# Patient Record
Sex: FEMALE | Race: WHITE | NOT HISPANIC OR LATINO | Employment: OTHER | URBAN - METROPOLITAN AREA
[De-identification: names, ages, dates, MRNs, and addresses within clinical notes are randomized per-mention and may not be internally consistent; named-entity substitution may affect disease eponyms.]

---

## 2019-04-08 PROBLEM — Z79.4 TYPE 2 DIABETES MELLITUS WITH DIABETIC POLYNEUROPATHY, WITH LONG-TERM CURRENT USE OF INSULIN: Status: ACTIVE | Noted: 2019-04-08

## 2019-04-08 PROBLEM — N18.30 CKD STAGE 3 DUE TO TYPE 2 DIABETES MELLITUS: Status: ACTIVE | Noted: 2019-04-08

## 2019-04-08 PROBLEM — E11.42 TYPE 2 DIABETES MELLITUS WITH DIABETIC POLYNEUROPATHY, WITH LONG-TERM CURRENT USE OF INSULIN: Status: ACTIVE | Noted: 2019-04-08

## 2019-04-08 PROBLEM — E11.22 CKD STAGE 3 DUE TO TYPE 2 DIABETES MELLITUS: Status: ACTIVE | Noted: 2019-04-08

## 2019-04-08 PROBLEM — I10 ESSENTIAL HYPERTENSION: Status: ACTIVE | Noted: 2019-04-08

## 2021-01-10 ENCOUNTER — IMMUNIZATION (OUTPATIENT)
Dept: FAMILY MEDICINE | Facility: CLINIC | Age: 81
End: 2021-01-10
Payer: MEDICARE

## 2021-01-10 DIAGNOSIS — Z23 NEED FOR VACCINATION: ICD-10-CM

## 2021-01-10 PROCEDURE — 0001A COVID-19, MRNA, LNP-S, PF, 30 MCG/0.3 ML DOSE VACCINE: CPT | Mod: CV19,,, | Performed by: FAMILY MEDICINE

## 2021-01-10 PROCEDURE — 91300 COVID-19, MRNA, LNP-S, PF, 30 MCG/0.3 ML DOSE VACCINE: CPT | Mod: ,,, | Performed by: FAMILY MEDICINE

## 2021-01-10 PROCEDURE — 91300 COVID-19, MRNA, LNP-S, PF, 30 MCG/0.3 ML DOSE VACCINE: ICD-10-PCS | Mod: ,,, | Performed by: FAMILY MEDICINE

## 2021-01-10 PROCEDURE — 0001A COVID-19, MRNA, LNP-S, PF, 30 MCG/0.3 ML DOSE VACCINE: ICD-10-PCS | Mod: CV19,,, | Performed by: FAMILY MEDICINE

## 2021-01-31 ENCOUNTER — IMMUNIZATION (OUTPATIENT)
Dept: FAMILY MEDICINE | Facility: CLINIC | Age: 81
End: 2021-01-31
Payer: MEDICARE

## 2021-01-31 DIAGNOSIS — Z23 NEED FOR VACCINATION: Primary | ICD-10-CM

## 2021-01-31 PROCEDURE — 91300 COVID-19, MRNA, LNP-S, PF, 30 MCG/0.3 ML DOSE VACCINE: CPT | Mod: ,,, | Performed by: INTERNAL MEDICINE

## 2021-01-31 PROCEDURE — 0002A COVID-19, MRNA, LNP-S, PF, 30 MCG/0.3 ML DOSE VACCINE: ICD-10-PCS | Mod: CV19,,, | Performed by: INTERNAL MEDICINE

## 2021-01-31 PROCEDURE — 0002A COVID-19, MRNA, LNP-S, PF, 30 MCG/0.3 ML DOSE VACCINE: CPT | Mod: CV19,,, | Performed by: INTERNAL MEDICINE

## 2021-01-31 PROCEDURE — 91300 COVID-19, MRNA, LNP-S, PF, 30 MCG/0.3 ML DOSE VACCINE: ICD-10-PCS | Mod: ,,, | Performed by: INTERNAL MEDICINE

## 2021-02-14 PROBLEM — E11.3293 MILD NONPROLIFERATIVE DIABETIC RETINOPATHY OF BOTH EYES WITHOUT MACULAR EDEMA ASSOCIATED WITH TYPE 2 DIABETES MELLITUS: Status: ACTIVE | Noted: 2021-02-14

## 2022-07-11 ENCOUNTER — TELEPHONE (OUTPATIENT)
Dept: NEPHROLOGY | Facility: CLINIC | Age: 82
End: 2022-07-11

## 2022-07-11 ENCOUNTER — TELEPHONE (OUTPATIENT)
Dept: NEPHROLOGY | Facility: CLINIC | Age: 82
End: 2022-07-11
Payer: MEDICARE

## 2022-07-11 ENCOUNTER — OFFICE VISIT (OUTPATIENT)
Dept: NEPHROLOGY | Facility: CLINIC | Age: 82
End: 2022-07-11
Payer: MEDICARE

## 2022-07-11 VITALS
HEART RATE: 79 BPM | DIASTOLIC BLOOD PRESSURE: 36 MMHG | BODY MASS INDEX: 29.21 KG/M2 | HEIGHT: 63 IN | WEIGHT: 164.88 LBS | OXYGEN SATURATION: 98 % | SYSTOLIC BLOOD PRESSURE: 154 MMHG

## 2022-07-11 DIAGNOSIS — E87.5 HYPERKALEMIA: ICD-10-CM

## 2022-07-11 DIAGNOSIS — I10 ESSENTIAL HYPERTENSION: ICD-10-CM

## 2022-07-11 DIAGNOSIS — E11.22 CKD STAGE 3 DUE TO TYPE 2 DIABETES MELLITUS: ICD-10-CM

## 2022-07-11 DIAGNOSIS — N18.30 CKD STAGE 3 DUE TO TYPE 2 DIABETES MELLITUS: ICD-10-CM

## 2022-07-11 DIAGNOSIS — E11.3293 MILD NONPROLIFERATIVE DIABETIC RETINOPATHY OF BOTH EYES WITHOUT MACULAR EDEMA ASSOCIATED WITH TYPE 2 DIABETES MELLITUS: Primary | ICD-10-CM

## 2022-07-11 PROCEDURE — 3078F PR MOST RECENT DIASTOLIC BLOOD PRESSURE < 80 MM HG: ICD-10-PCS | Mod: CPTII,S$GLB,, | Performed by: INTERNAL MEDICINE

## 2022-07-11 PROCEDURE — 99999 PR PBB SHADOW E&M-EST. PATIENT-LVL IV: ICD-10-PCS | Mod: PBBFAC,,, | Performed by: INTERNAL MEDICINE

## 2022-07-11 PROCEDURE — 1159F MED LIST DOCD IN RCRD: CPT | Mod: CPTII,S$GLB,, | Performed by: INTERNAL MEDICINE

## 2022-07-11 PROCEDURE — 3288F FALL RISK ASSESSMENT DOCD: CPT | Mod: CPTII,S$GLB,, | Performed by: INTERNAL MEDICINE

## 2022-07-11 PROCEDURE — 3078F DIAST BP <80 MM HG: CPT | Mod: CPTII,S$GLB,, | Performed by: INTERNAL MEDICINE

## 2022-07-11 PROCEDURE — 1101F PT FALLS ASSESS-DOCD LE1/YR: CPT | Mod: CPTII,S$GLB,, | Performed by: INTERNAL MEDICINE

## 2022-07-11 PROCEDURE — 99204 OFFICE O/P NEW MOD 45 MIN: CPT | Mod: S$GLB,,, | Performed by: INTERNAL MEDICINE

## 2022-07-11 PROCEDURE — 3077F PR MOST RECENT SYSTOLIC BLOOD PRESSURE >= 140 MM HG: ICD-10-PCS | Mod: CPTII,S$GLB,, | Performed by: INTERNAL MEDICINE

## 2022-07-11 PROCEDURE — 1160F RVW MEDS BY RX/DR IN RCRD: CPT | Mod: CPTII,S$GLB,, | Performed by: INTERNAL MEDICINE

## 2022-07-11 PROCEDURE — 99204 PR OFFICE/OUTPT VISIT, NEW, LEVL IV, 45-59 MIN: ICD-10-PCS | Mod: S$GLB,,, | Performed by: INTERNAL MEDICINE

## 2022-07-11 PROCEDURE — 99999 PR PBB SHADOW E&M-EST. PATIENT-LVL IV: CPT | Mod: PBBFAC,,, | Performed by: INTERNAL MEDICINE

## 2022-07-11 PROCEDURE — 1159F PR MEDICATION LIST DOCUMENTED IN MEDICAL RECORD: ICD-10-PCS | Mod: CPTII,S$GLB,, | Performed by: INTERNAL MEDICINE

## 2022-07-11 PROCEDURE — 1160F PR REVIEW ALL MEDS BY PRESCRIBER/CLIN PHARMACIST DOCUMENTED: ICD-10-PCS | Mod: CPTII,S$GLB,, | Performed by: INTERNAL MEDICINE

## 2022-07-11 PROCEDURE — 3288F PR FALLS RISK ASSESSMENT DOCUMENTED: ICD-10-PCS | Mod: CPTII,S$GLB,, | Performed by: INTERNAL MEDICINE

## 2022-07-11 PROCEDURE — 1101F PR PT FALLS ASSESS DOC 0-1 FALLS W/OUT INJ PAST YR: ICD-10-PCS | Mod: CPTII,S$GLB,, | Performed by: INTERNAL MEDICINE

## 2022-07-11 PROCEDURE — 3077F SYST BP >= 140 MM HG: CPT | Mod: CPTII,S$GLB,, | Performed by: INTERNAL MEDICINE

## 2022-07-11 NOTE — TELEPHONE ENCOUNTER
----- Message from Edy Joaquin sent at 7/11/2022  1:23 PM CDT -----  Contact: Rebecca Dominique (Daughter)  Type: Patient Call Back    Who called:Rebecca Blevinssampson (Daughter)    What is the request in detail: The patient daughter would like a call in regards to appointment for today    Can the clinic reply by [facility]?    Would the patient rather a call back or a response via [facility]?     Best call back number:[phone]    Additional Information:

## 2022-07-11 NOTE — TELEPHONE ENCOUNTER
----- Message from Pablito John MA sent at 7/11/2022 12:54 PM CDT -----  Contact: OSMIN MOBLEY [[id number]]  Type: Needs Medical Advice    Who Called:OSMIN MOBLEY [[id number]]  Best Call Back Number: [phone]  Inquiry/Question: Please call OSMIN MOBLEY [[id number]] regarding missed call from clinic    Thank you~

## 2022-07-11 NOTE — PROGRESS NOTES
"Subjective:       Patient ID: Claudia Guerra is a 82 y.o. White female who presents for initial evaluation of serum creatinine referred by PCP, dr Cosby.     Renal function per chart review with sr cr baseline of 1.3 mg/dL in 2021 and now increased to 1.5 mg/dL with moderately increased albuminuria.    - DM type 2 diagnosed in early 1990's. Review of hemoglobin A1C show moderate control with average HgbA1c between 8-8%. Never hospitalized for DKA. With proteinuria,  currently on RASSi.   - HTN. Patient reports good adherence to the current regiment, which includes Losartan 100 mg and Lasix 20 mg. They are following low salt diet. Never hospitalized for uncontrolled HTN or hypotension/syncopal episodes.    Denies use of NSAIDs, nephrolithiasis or fam Hx of renal disease.      Review of Systems   Constitutional: Negative for appetite change, chills and fever.   HENT: Negative for congestion.    Eyes: Negative for visual disturbance.   Respiratory: Negative for cough and shortness of breath.    Cardiovascular: Negative for chest pain and leg swelling.   Gastrointestinal: Negative for abdominal pain, diarrhea, nausea and vomiting.   Genitourinary: Negative for difficulty urinating, dysuria and hematuria.   Musculoskeletal: Negative for myalgias.   Skin: Negative for rash.   Neurological: Negative for headaches.   Psychiatric/Behavioral: Negative for sleep disturbance.       The past medical, family and social histories were reviewed for this encounter.     BP (!) 154/36 (BP Location: Right arm, Patient Position: Sitting, BP Method: Medium (Manual))   Pulse 79   Ht 5' 3" (1.6 m)   Wt 74.8 kg (164 lb 14.5 oz)   SpO2 98%   BMI 29.21 kg/m²     Objective:      Physical Exam  Vitals and nursing note reviewed.   Constitutional:       Appearance: Normal appearance. She is obese.   HENT:      Head: Normocephalic and atraumatic.      Mouth/Throat:      Mouth: Mucous membranes are moist.      Pharynx: Oropharynx is clear. "   Eyes:      Extraocular Movements: Extraocular movements intact.      Conjunctiva/sclera: Conjunctivae normal.   Neck:      Vascular: No carotid bruit.   Cardiovascular:      Rate and Rhythm: Normal rate and regular rhythm.      Heart sounds: Normal heart sounds.   Pulmonary:      Effort: Pulmonary effort is normal. No respiratory distress.      Breath sounds: Normal breath sounds.   Abdominal:      General: Bowel sounds are normal. There is no distension.      Palpations: Abdomen is soft.      Tenderness: There is no abdominal tenderness.   Musculoskeletal:         General: Normal range of motion.      Cervical back: Normal range of motion. No tenderness.   Lymphadenopathy:      Cervical: No cervical adenopathy.   Skin:     General: Skin is warm and dry.      Capillary Refill: Capillary refill takes less than 2 seconds.      Coloration: Skin is not jaundiced.   Neurological:      General: No focal deficit present.      Mental Status: She is alert.   Psychiatric:         Mood and Affect: Mood normal.         Behavior: Behavior normal.         Judgment: Judgment normal.         Assessment:       1. Mild nonproliferative diabetic retinopathy of both eyes without macular edema associated with type 2 diabetes mellitus    2. CKD stage 3 due to type 2 diabetes mellitus    3. Essential hypertension    4. Hyperkalemia        Plan:   Return to clinic in 6 months    CKD stage G3bA2 in a setting of DM  Baseline creatinine is 1.5 mg/dL with moderately increased proteinuria  Lab Results   Component Value Date    CREATININE 1.54 (H) 06/02/2022      - Hypervolemic   - Pt understands importance of blood pressure and glycemic control and is aware that uncontrolled HTN and DM leads to progression of CKD   - Complete avoidance of NSAIDs   - Low salt diet with < 2000 mg/day   - Dose adjust medications to GFR of 30-45   - Avoid nephrotoxins including IV contrast    HTN   - BP well controlled: continue current medications, avoid  hypotension and dehydration    DM   - DM with diabetic retinopathy: uncontrolled with most recent HgbA1c of 9.3%, continue yearly optho checks    Proteinuria   - Due to DM    Anemia   - check Fe studies   Med changes: none

## 2022-07-14 ENCOUNTER — HOSPITAL ENCOUNTER (OUTPATIENT)
Dept: RADIOLOGY | Facility: HOSPITAL | Age: 82
Discharge: HOME | End: 2022-07-14
Attending: INTERNAL MEDICINE
Payer: MEDICARE

## 2022-07-14 DIAGNOSIS — E87.5 HYPERKALEMIA: ICD-10-CM

## 2022-07-14 DIAGNOSIS — E11.3293 MILD NONPROLIFERATIVE DIABETIC RETINOPATHY OF BOTH EYES WITHOUT MACULAR EDEMA ASSOCIATED WITH TYPE 2 DIABETES MELLITUS: ICD-10-CM

## 2022-07-14 DIAGNOSIS — I10 ESSENTIAL HYPERTENSION: ICD-10-CM

## 2022-07-14 DIAGNOSIS — N18.30 CKD STAGE 3 DUE TO TYPE 2 DIABETES MELLITUS: ICD-10-CM

## 2022-07-14 DIAGNOSIS — E11.22 CKD STAGE 3 DUE TO TYPE 2 DIABETES MELLITUS: ICD-10-CM

## 2022-07-14 PROCEDURE — 76770 US EXAM ABDO BACK WALL COMP: CPT | Mod: 26,,, | Performed by: RADIOLOGY

## 2022-07-14 PROCEDURE — 76770 US EXAM ABDO BACK WALL COMP: CPT | Mod: TC,PO

## 2022-07-14 PROCEDURE — 76770 US RETROPERITONEAL COMPLETE: ICD-10-PCS | Mod: 26,,, | Performed by: RADIOLOGY

## 2022-07-20 ENCOUNTER — TELEPHONE (OUTPATIENT)
Dept: NEPHROLOGY | Facility: CLINIC | Age: 82
End: 2022-07-20
Payer: MEDICARE

## 2022-07-20 NOTE — TELEPHONE ENCOUNTER
----- Message from Karen Rhoades sent at 7/20/2022  3:32 PM CDT -----  Contact: [phone]  Type:  Patient Returning Call    Who Called: Pt  Who Left Message for Patient:  NA  Does the patient know what this is regarding?:  Possibly results from test last week   Best Call Back Number:  [phone]    Additional Information:  Pls rigoberto back and advise.    Pt unsure who called her.

## 2022-07-21 DIAGNOSIS — E87.5 HYPERKALEMIA: Primary | ICD-10-CM

## 2022-07-21 NOTE — PROGRESS NOTES
Hyperkalemia on last labs   - stop potasium supplementation   - lasix extra dose today and tomorrow  - Recheck labs on Monday

## 2022-10-15 PROBLEM — E66.09 CLASS 1 OBESITY DUE TO EXCESS CALORIES WITH SERIOUS COMORBIDITY AND BODY MASS INDEX (BMI) OF 31.0 TO 31.9 IN ADULT: Status: ACTIVE | Noted: 2022-10-15

## 2022-10-15 PROBLEM — I63.9 STROKE: Status: ACTIVE | Noted: 2022-10-15

## 2022-10-15 PROBLEM — Z71.89 ACP (ADVANCE CARE PLANNING): Status: ACTIVE | Noted: 2022-10-15

## 2022-10-16 PROBLEM — R79.89 ELEVATED TSH: Status: ACTIVE | Noted: 2022-10-16

## 2022-10-17 PROBLEM — E16.2 HYPOGLYCEMIA: Status: ACTIVE | Noted: 2022-10-17

## 2022-10-18 PROBLEM — I63.312 CEREBROVASCULAR ACCIDENT (CVA) DUE TO THROMBOSIS OF LEFT MIDDLE CEREBRAL ARTERY: Status: ACTIVE | Noted: 2022-10-18

## 2022-10-18 PROBLEM — I63.81 BASAL GANGLIA STROKE: Status: ACTIVE | Noted: 2022-10-15

## 2022-10-21 PROBLEM — R47.1 DYSARTHRIA: Status: ACTIVE | Noted: 2022-10-21

## 2022-10-21 PROBLEM — R25.2 MUSCLE CRAMP, NOCTURNAL: Status: ACTIVE | Noted: 2022-10-21

## 2022-10-21 PROBLEM — I69.359 HEMIPLEGIA AS LATE EFFECT OF CEREBROVASCULAR ACCIDENT (CVA): Status: ACTIVE | Noted: 2022-10-21

## 2022-10-23 PROBLEM — E11.3293 MILD NONPROLIFERATIVE DIABETIC RETINOPATHY OF BOTH EYES WITHOUT MACULAR EDEMA ASSOCIATED WITH TYPE 2 DIABETES MELLITUS: Status: RESOLVED | Noted: 2021-02-14 | Resolved: 2022-10-23

## 2022-10-23 PROBLEM — B35.4 TINEA CORPORIS: Status: ACTIVE | Noted: 2022-10-23

## 2022-10-23 PROBLEM — I63.312 CEREBROVASCULAR ACCIDENT (CVA) DUE TO THROMBOSIS OF LEFT MIDDLE CEREBRAL ARTERY: Status: RESOLVED | Noted: 2022-10-18 | Resolved: 2022-10-23

## 2022-12-13 PROBLEM — L97.511 ULCER OF RIGHT FOOT, LIMITED TO BREAKDOWN OF SKIN: Status: ACTIVE | Noted: 2022-12-13

## 2022-12-13 PROBLEM — K59.00 CONSTIPATION: Status: ACTIVE | Noted: 2022-12-13

## 2022-12-13 PROBLEM — R53.81 PHYSICAL DECONDITIONING: Status: ACTIVE | Noted: 2022-12-13

## 2022-12-13 PROBLEM — K52.9 GASTROENTERITIS: Status: ACTIVE | Noted: 2022-12-13

## 2022-12-13 PROBLEM — U07.1 COVID-19 VIRUS INFECTION: Status: ACTIVE | Noted: 2022-12-13

## 2022-12-13 PROBLEM — N17.9 AKI (ACUTE KIDNEY INJURY): Status: ACTIVE | Noted: 2022-12-13

## 2022-12-16 PROBLEM — D72.819 LEUKOPENIA: Status: ACTIVE | Noted: 2022-12-16

## 2023-01-10 ENCOUNTER — TELEPHONE (OUTPATIENT)
Dept: NEPHROLOGY | Facility: CLINIC | Age: 83
End: 2023-01-10

## 2023-01-10 NOTE — TELEPHONE ENCOUNTER
Patient canceled appointment today and daughter stated that she will no longer see . Patient is in a nursing home. I am sending this message to .

## 2023-01-10 NOTE — TELEPHONE ENCOUNTER
----- Message from Jannette Machado sent at 1/10/2023  7:14 AM CST -----  Contact: Rebecca  Type: Needs Medical Advice  Who Called: Pt daughter Rebecca  Symptoms (please be specific):   How long has patient had these symptoms:    Pharmacy name and phone #:    Best Call Back Number: [phone]  Additional Information: Pt daughter wants to inform Dr Stein the her mom now lives in a nursing home and will not be seeing her as a patient any more.

## 2023-03-20 PROBLEM — I63.81 BASAL GANGLIA STROKE: Status: RESOLVED | Noted: 2022-10-15 | Resolved: 2023-03-20

## 2023-03-20 PROBLEM — N17.9 AKI (ACUTE KIDNEY INJURY): Status: RESOLVED | Noted: 2022-12-13 | Resolved: 2023-03-20
